# Patient Record
Sex: MALE | Race: WHITE | Employment: FULL TIME | URBAN - METROPOLITAN AREA
[De-identification: names, ages, dates, MRNs, and addresses within clinical notes are randomized per-mention and may not be internally consistent; named-entity substitution may affect disease eponyms.]

---

## 2022-02-02 ENCOUNTER — TRANSCRIBE ORDER (OUTPATIENT)
Dept: SCHEDULING | Age: 52
End: 2022-02-02

## 2022-02-02 DIAGNOSIS — R42 DIZZINESS AND GIDDINESS: Primary | ICD-10-CM

## 2022-02-02 DIAGNOSIS — R42 VERTIGO: ICD-10-CM

## 2022-02-04 ENCOUNTER — TRANSCRIBE ORDER (OUTPATIENT)
Dept: SCHEDULING | Age: 52
End: 2022-02-04

## 2022-02-04 DIAGNOSIS — R42 DIZZINESS AND GIDDINESS: Primary | ICD-10-CM

## 2022-02-14 ENCOUNTER — HOSPITAL ENCOUNTER (OUTPATIENT)
Dept: MRI IMAGING | Age: 52
Discharge: HOME | End: 2022-02-14
Attending: OTOLARYNGOLOGY
Payer: OTHER GOVERNMENT

## 2022-02-14 VITALS — WEIGHT: 150 LBS

## 2022-02-14 DIAGNOSIS — R42 DIZZINESS AND GIDDINESS: ICD-10-CM

## 2022-02-14 DIAGNOSIS — R42 VERTIGO: ICD-10-CM

## 2022-02-14 LAB — CREAT UR-MCNC: 0.9 MG/DL (ref 0.6–1.3)

## 2022-02-14 PROCEDURE — 70553 MRI BRAIN STEM W/O & W/DYE: CPT

## 2022-02-14 PROCEDURE — A9576 INJ PROHANCE MULTIPACK: HCPCS | Performed by: OTOLARYNGOLOGY

## 2022-02-14 PROCEDURE — 74011250636 HC RX REV CODE- 250/636: Performed by: OTOLARYNGOLOGY

## 2022-02-14 PROCEDURE — 82565 ASSAY OF CREATININE: CPT

## 2022-02-14 RX ADMIN — GADOTERIDOL 15 ML: 279.3 INJECTION, SOLUTION INTRAVENOUS at 14:50

## 2022-09-09 ENCOUNTER — TRANSCRIBE ORDER (OUTPATIENT)
Dept: REGISTRATION | Age: 52
End: 2022-09-09

## 2022-09-09 ENCOUNTER — HOSPITAL ENCOUNTER (OUTPATIENT)
Dept: NON INVASIVE DIAGNOSTICS | Age: 52
Discharge: HOME | End: 2022-09-09
Payer: OTHER GOVERNMENT

## 2022-09-09 ENCOUNTER — HOSPITAL ENCOUNTER (OUTPATIENT)
Dept: LAB | Age: 52
Discharge: HOME | End: 2022-09-09
Payer: OTHER GOVERNMENT

## 2022-09-09 DIAGNOSIS — J34.2 DEVIATED NASAL SEPTUM: ICD-10-CM

## 2022-09-09 DIAGNOSIS — J34.2 DEVIATED NASAL SEPTUM: Primary | ICD-10-CM

## 2022-09-09 LAB
ATRIAL RATE: 74 BPM
CALCULATED P AXIS, ECG09: 76 DEGREES
CALCULATED R AXIS, ECG10: 73 DEGREES
CALCULATED T AXIS, ECG11: 58 DEGREES
DIAGNOSIS, 93000: NORMAL
HCT VFR BLD AUTO: 47.6 % (ref 36–48)
HGB BLD-MCNC: 16.2 G/DL (ref 13–16)
P-R INTERVAL, ECG05: 134 MS
POTASSIUM SERPL-SCNC: 4.5 MMOL/L (ref 3.5–5.5)
Q-T INTERVAL, ECG07: 390 MS
QRS DURATION, ECG06: 88 MS
QTC CALCULATION (BEZET), ECG08: 432 MS
VENTRICULAR RATE, ECG03: 74 BPM

## 2022-09-09 PROCEDURE — 36415 COLL VENOUS BLD VENIPUNCTURE: CPT

## 2022-09-09 PROCEDURE — 84132 ASSAY OF SERUM POTASSIUM: CPT

## 2022-09-09 PROCEDURE — 85018 HEMOGLOBIN: CPT

## 2022-09-09 PROCEDURE — 93005 ELECTROCARDIOGRAM TRACING: CPT

## 2022-09-20 ENCOUNTER — HOSPITAL ENCOUNTER (OUTPATIENT)
Dept: LAB | Age: 52
Discharge: HOME | End: 2022-09-20
Payer: OTHER GOVERNMENT

## 2022-09-20 PROCEDURE — 88304 TISSUE EXAM BY PATHOLOGIST: CPT

## 2022-09-20 PROCEDURE — 88311 DECALCIFY TISSUE: CPT

## 2024-05-10 ENCOUNTER — TELEPHONE (OUTPATIENT)
Facility: HOSPITAL | Age: 54
End: 2024-05-10

## 2024-05-13 ENCOUNTER — HOSPITAL ENCOUNTER (OUTPATIENT)
Facility: HOSPITAL | Age: 54
Setting detail: RECURRING SERIES
Discharge: HOME | End: 2024-05-16
Payer: OTHER GOVERNMENT

## 2024-05-13 PROCEDURE — 97110 THERAPEUTIC EXERCISES: CPT

## 2024-05-13 PROCEDURE — 97161 PT EVAL LOW COMPLEX 20 MIN: CPT

## 2024-05-13 NOTE — PROGRESS NOTES
PT DAILY TREATMENT NOTE/KNEE EVAL 10-18    Patient Name: Julius Heller  Date:2024  : 1970  [x]  Patient  Verified  Payor: [employer] EAST / Plan: [employer] EAST PRIME / Product Type: *No Product type* /    In time:1330  Out time:1415  Total Treatment Time (min): 45  Visit #: 1 of 15    Treatment Area: Right knee pain [M25.561]    SUBJECTIVE  Pain Level (0-10 scale):2  []constant [x]intermittent []improving []worsening []no change since onset    Any medication changes, allergies to medications, adverse drug reactions, diagnosis change, or new procedure performed?: [x] No    [] Yes (see summary sheet for update)  Subjective functional status/changes:     Patient presents with c/o right knee after chasing a dog on approximately 2023.  Patient describes pain as sharp. Denies numbness/tingling. Denies popping/clicking. Aggravating factors:squatting, lunging, kneeling, walking, hiking. Alleviating factors: rest. Denies red flags: SOB, chest pain, dizziness/lightheadedness, blurred/double vision, HA, chills/fevers, night sweats, change in bowel/bladder control, abdominal pain, difficulty swallowing, slurred speech, unexplained weight gain/loss, nausea, vomiting. PMHx: HTN, GERD. Surgical Hx: deviated septum. Social Hx: Lives with spouse in a two story home, work status: . PLOF: hiking, running.  Diagnostic Imaging: none per     OBJECTIVE/EXAMINATION    22 min [x]Eval                  []Re-Eval       23 min Therapeutic Exercise:  [x] See flow sheet :   Rationale: increase ROM, increase strength and decrease pain to improve the patient’s ability to complete ADLs        With   [x] TE   [] TA   [] neuro   [] other: Patient Education: [x] Review HEP    [] Progressed/Changed HEP based on:   [] positioning   [] body mechanics   [] transfers   [] heat/ice application    [] other:        Physical Therapy Evaluation - Knee    Posture: Forward head and rounded shoulder    Gait:  [] Normal    [] 
rehabilitation/strengthening program.   Status at IE: NA   Current:Same as IE     Patient will increase strength to 5/5 throughout bilateral LEs to aid in completion of ADLs.   Status at IE:4-/5   Current:Same as IE     Patient will report pain less than 1-2/10 average to aid in completion of ADLs.   Status at IE:2-5/10   Current:Same as IE     Patient will perform 10 pain free STS with 15 lbs with good form/technique to aid in completion of ADLs.   Status at IE:pain during STS   Current:Same as IE    Patient will improve FOTO to 67 points overall to demonstrate improvement in functional ability.   Status at IE:FOTO score = 52 (an established functional score where 100 = no disability)   Current: Same as IE      Frequency / Duration: Patient to be seen 2 times per week for 8 weeks    Patient/ Caregiver education and instruction: Diagnosis, prognosis, self care, activity modification, and exercises     [x]  Plan of care has been reviewed with PTA    Certification Period: KARL Villavicencio, PT 5/13/2024 7:50 PM  _____________________________________________________________________  I certify that the above Therapy Services are being furnished while the patient is under my care. I agree with the treatment plan and certify that this therapy is necessary.    Physician's Signature:_______________________ Date:_________ TIME:________                              Skinny Schmidt DO  Insurance: Payor:  EAST / Plan:  EAST PRIME / Product Type: *No Product type* /      ** Signature, Date and Time must be completed for valid certification **    [facility]  [address] [address]  Phone: [phone]   Fax: [phone]

## 2024-05-17 ENCOUNTER — TELEPHONE (OUTPATIENT)
Facility: HOSPITAL | Age: 54
End: 2024-05-17

## 2024-05-17 ENCOUNTER — HOSPITAL ENCOUNTER (OUTPATIENT)
Facility: HOSPITAL | Age: 54
Setting detail: RECURRING SERIES
Discharge: HOME | End: 2024-05-20
Payer: OTHER GOVERNMENT

## 2024-05-17 PROCEDURE — 97112 NEUROMUSCULAR REEDUCATION: CPT

## 2024-05-17 PROCEDURE — 97530 THERAPEUTIC ACTIVITIES: CPT

## 2024-05-17 PROCEDURE — 97110 THERAPEUTIC EXERCISES: CPT

## 2024-05-17 NOTE — PROGRESS NOTES
totals to left)  8-22 min = 1 unit; 23-37 min = 2 units; 38-52 min = 3 units; 53-67 min = 4 units; 68-82 min = 5 units   Total Total     TOTAL TREATMENT TIME:        40     [x]  Patient Education billed concurrently with other procedures   [x] Review HEP    [] Progressed/Changed HEP, detail:    [] Other detail:       Objective Information/Functional Measures/Assessment    Pt tolerated first follow up well with low load strengthening. He requires some visual cues with use of a mirror to facilitate even WB during sit to stands as he offloads his RLE. No adverse response to treatment noted.    Patient will continue to benefit from skilled PT / OT services to modify and progress therapeutic interventions, analyze and address functional mobility deficits, analyze and address ROM deficits, analyze and address strength deficits, analyze and address soft tissue restrictions, analyze and cue for proper movement patterns, and instruct in home and community integration to address functional deficits and attain remaining goals.    Progress toward goals / Updated goals:  []  See Progress Note/Recertification    Short Term Goals: To be accomplished in 4 weeks:                 Patient will report compliance with HEP at least 1x/day to aid in rehabilitation program.                 Status at IE:Provided initial HEP                 Current:Same as IE     Long Term Goals: To be accomplished in 8 weeks:                    Patient will report compliance with HEP a least 3-4x/week to aid in rehabilitation/strengthening program.                 Status at IE: NA                 Current:Same as IE                    Patient will increase strength to 5/5 throughout bilateral LEs to aid in completion of ADLs.                 Status at IE:4-/5                 Current:Same as IE                    Patient will report pain less than 1-2/10 average to aid in completion of ADLs.                 Status at IE:2-5/10                 Current:Same as

## 2024-05-20 ENCOUNTER — HOSPITAL ENCOUNTER (OUTPATIENT)
Facility: HOSPITAL | Age: 54
Setting detail: RECURRING SERIES
Discharge: HOME | End: 2024-05-23
Payer: OTHER GOVERNMENT

## 2024-05-20 PROCEDURE — 97016 VASOPNEUMATIC DEVICE THERAPY: CPT

## 2024-05-20 PROCEDURE — 97112 NEUROMUSCULAR REEDUCATION: CPT

## 2024-05-20 PROCEDURE — 97530 THERAPEUTIC ACTIVITIES: CPT

## 2024-05-20 PROCEDURE — 97110 THERAPEUTIC EXERCISES: CPT

## 2024-05-20 NOTE — PROGRESS NOTES
PHYSICAL / OCCUPATIONAL THERAPY - DAILY TREATMENT NOTE     Patient Name: Julius Heller    Date: 2024    : 1970  Insurance: Payor:  EAST / Plan:  EAST PRIME / Product Type: *No Product type* /      Patient  verified Yes     Visit #   Current / Total 3 16   Time   In / Out 1010 1109   Pain   In / Out 2 1-2   Subjective Functional Status/Changes: \"I had muscle soreness after last visit but no increase in knee pain.\"   Changes to:  Allergies, Med Hx, Sx Hx?   no       TREATMENT AREA =  Right knee pain [M25.561]    OBJECTIVE    Modalities Rationale:     decrease edema, decrease inflammation, decrease pain, increase tissue extensibility, and increase muscle contraction/control to improve patient's ability to progress to PLOF and address remaining functional goals.     10 min [x]  Vasopneumatic Device, press/temp: Med/34 degrees   If using vaso (only need to measure limb vaso being performed on)      pre-treatment girth : 35.8 cm      post-treatment girth : 35.5 cm      measured at (landmark location) :  right knee mid patella   Skin assessment post-treatment (if applicable):    []  intact    []  redness- no adverse reaction                 []redness - adverse reaction:         Therapeutic Procedures:  Tx Min Billable or 1:1 Min (if diff from Tx Min) Procedure, Rationale, Specifics   23  19976 Therapeutic Exercise (timed):  increase ROM, strength, coordination, balance, and proprioception to improve patient's ability to progress to PLOF and address remaining functional goals. (see flow sheet as applicable)    Details if applicable:       10  55600 Neuromuscular Re-Education (timed):  improve balance, coordination, kinesthetic sense, posture, core stability and proprioception to improve patient's ability to develop conscious control of individual muscles and awareness of position of extremities in order to progress to PLOF and address remaining functional goals. (see flow sheet as

## 2024-05-22 ENCOUNTER — HOSPITAL ENCOUNTER (OUTPATIENT)
Facility: HOSPITAL | Age: 54
Setting detail: RECURRING SERIES
Discharge: HOME | End: 2024-05-25
Payer: OTHER GOVERNMENT

## 2024-05-22 PROCEDURE — 97530 THERAPEUTIC ACTIVITIES: CPT

## 2024-05-22 PROCEDURE — 97016 VASOPNEUMATIC DEVICE THERAPY: CPT

## 2024-05-22 PROCEDURE — 97110 THERAPEUTIC EXERCISES: CPT

## 2024-05-22 PROCEDURE — 97112 NEUROMUSCULAR REEDUCATION: CPT

## 2024-05-22 NOTE — PROGRESS NOTES
PHYSICAL / OCCUPATIONAL THERAPY - DAILY TREATMENT NOTE     Patient Name: Julius Heller    Date: 2024    : 1970  Insurance: Payor:  EAST / Plan:  EAST PRIME / Product Type: *No Product type* /      Patient  verified Yes     Visit #   Current / Total 4 16   Time   In / Out 2:59 3:55   Pain   In / Out 0 0   Subjective Functional Status/Changes: Pt reports mild soreness, but no pain in right knee today   Changes to:  Allergies, Med Hx, Sx Hx?   no       TREATMENT AREA =  Right knee pain [M25.561]    OBJECTIVE    Modalities Rationale:     decrease edema, decrease inflammation, decrease pain, increase tissue extensibility, and increase muscle contraction/control to improve patient's ability to progress to PLOF and address remaining functional goals.    10 min [x]  Vasopneumatic Device, press/temp: Med/34 degrees    min []  Whirlpool / Fluido:    If using vaso (only need to measure limb vaso being performed on)      pre-treatment girth : 36.1 cm      post-treatment girth : 35.4 cm      measured at (landmark location) :  Mid patella right knee    min []  Other:    Skin assessment post-treatment (if applicable):    [x]  intact    []  redness- no adverse reaction                 []redness - adverse reaction:         Therapeutic Procedures:  Tx Min Billable or 1:1 Min (if diff from Tx Min) Procedure, Rationale, Specifics   20  38115 Therapeutic Exercise (timed):  increase ROM, strength, coordination, balance, and proprioception to improve patient's ability to progress to PLOF and address remaining functional goals. (see flow sheet as applicable)    Details if applicable:       12  38340 Neuromuscular Re-Education (timed):  improve balance, coordination, kinesthetic sense, posture, core stability and proprioception to improve patient's ability to develop conscious control of individual muscles and awareness of position of extremities in order to progress to PLOF and address remaining

## 2024-05-29 ENCOUNTER — HOSPITAL ENCOUNTER (OUTPATIENT)
Facility: HOSPITAL | Age: 54
Setting detail: RECURRING SERIES
Discharge: HOME | End: 2024-06-01
Payer: OTHER GOVERNMENT

## 2024-05-29 PROCEDURE — 97110 THERAPEUTIC EXERCISES: CPT

## 2024-05-29 PROCEDURE — 97530 THERAPEUTIC ACTIVITIES: CPT

## 2024-05-29 PROCEDURE — 97112 NEUROMUSCULAR REEDUCATION: CPT

## 2024-05-29 PROCEDURE — 97016 VASOPNEUMATIC DEVICE THERAPY: CPT

## 2024-05-29 NOTE — PROGRESS NOTES
PHYSICAL / OCCUPATIONAL THERAPY - DAILY TREATMENT NOTE (updated )    Patient Name: Julius Heller    Date: 2024    : 1970  Insurance: Payor: [employer] EAST / Plan: [employer] EAST PRIME / Product Type: *No Product type* /      Patient  verified Yes     Visit #   Current / Total 5 15   Time   In / Out 1610 1709   Pain   In / Out 1-2 0   Subjective Functional Status/Changes: \"I took a swim test last night and the kicking of the leg while swimming seemed to aggravate the right knee a bit.\"   Changes to:  Meds, Allergies, Med Hx, Sx Hx?  If yes, update Summary List no       TREATMENT AREA =  Right knee pain [M25.561]    OBJECTIVE  Modalities Rationale:     decrease edema, decrease inflammation, decrease pain, increase tissue extensibility, and increase muscle contraction/control to improve patient's ability to progress to PLOF and address remaining functional goals.             10 min [x]  Vasopneumatic Device, press/temp: Med/34 degrees     min []  Whirlpool / Fluido:     If using vaso (only need to measure limb vaso being performed on)      pre-treatment girth : 36.0 cm      post-treatment girth : 35.3 cm      measured at (landmark location) :  Mid patella right knee     min []  Other:     Skin assessment post-treatment (if applicable):    [x]  intact    []  redness- no adverse reaction                 []redness - adverse reaction:       Therapeutic Procedures:  Tx Min Billable or 1:1 Min (if diff from Tx Min) Procedure, Rationale, Specifics     01134 Therapeutic Exercise (timed):  increase ROM, strength, coordination, balance, and proprioception to improve patient's ability to progress to PLOF and address remaining functional goals. (see flow sheet as applicable)     Details if applicable:       15  54647 Therapeutic Activity (timed):  use of dynamic activities replicating functional movements to increase ROM, strength, coordination, balance, and proprioception in order to improve patient's

## 2024-05-31 ENCOUNTER — HOSPITAL ENCOUNTER (OUTPATIENT)
Facility: HOSPITAL | Age: 54
Setting detail: RECURRING SERIES
End: 2024-05-31
Payer: OTHER GOVERNMENT

## 2024-05-31 PROCEDURE — 97530 THERAPEUTIC ACTIVITIES: CPT

## 2024-05-31 PROCEDURE — 97112 NEUROMUSCULAR REEDUCATION: CPT

## 2024-05-31 PROCEDURE — 97110 THERAPEUTIC EXERCISES: CPT

## 2024-05-31 NOTE — PROGRESS NOTES
PHYSICAL / OCCUPATIONAL THERAPY - DAILY TREATMENT NOTE (updated )    Patient Name: Julius Heller    Date: 2024    : 1970  Insurance: Payor:  EAST / Plan:  EAST PRIME / Product Type: *No Product type* /      Patient  verified Yes     Visit #   Current / Total 6 15   Time   In / Out 2:12 pm 2:47 pm   Pain   In / Out 2 0-1   Subjective Functional Status/Changes: \"Its been doing better. I was sore for the first time after last appt, but it was more muscle.\"   Changes to:  Meds, Allergies, Med Hx, Sx Hx?  If yes, update Summary List no       TREATMENT AREA =  Right knee pain [M25.561]    OBJECTIVE    Therapeutic Procedures:  Tx Min Billable or 1:1 Min (if diff from Tx Min) Procedure, Rationale, Specifics   15  29637 Therapeutic Exercise (timed):  increase ROM, strength, coordination, balance, and proprioception to improve patient's ability to progress to PLOF and address remaining functional goals. (see flow sheet as applicable)     Details if applicable:       10  50059 Therapeutic Activity (timed):  use of dynamic activities replicating functional movements to increase ROM, strength, coordination, balance, and proprioception in order to improve patient's ability to progress to PLOF and address remaining functional goals.  (see flow sheet as applicable)     Details if applicable:     10  20252 Neuromuscular Re-Education (timed):  improve balance, coordination, kinesthetic sense, posture, core stability and proprioception to improve patient's ability to develop conscious control of individual muscles and awareness of position of extremities in order to progress to PLOF and address remaining functional goals. (see flow sheet as applicable)     Details if applicable:     35  MC BC Totals Reminder: bill using total billable min of TIMED therapeutic procedures (example: do not include dry needle or estim unattended, both untimed codes, in totals to left)  8-22 min = 1 unit; 23-37 min =  2 units; 38-52 min = 3 units; 53-67 min = 4 units; 68-82 min = 5 units   Total Total       TOTAL TREATMENT TIME:     35       [x]  Patient Education billed concurrently with other procedures   [x] Review HEP    [] Progressed/Changed HEP, detail:    [] Other detail:       Objective Information/Functional Measures/Assessment    Patient reporting minimal symptoms with most activities. Minimal discomfort with lateral and eccentric step downs so held performance. Reduction in pain reported post treatment.    Patient will continue to benefit from skilled PT / OT services to modify and progress therapeutic interventions, analyze and address functional mobility deficits, analyze and address ROM deficits, analyze and address strength deficits, analyze and address soft tissue restrictions, analyze and cue for proper movement patterns, analyze and modify for postural abnormalities, analyze and address imbalance/dizziness, and instruct in home and community integration to address functional deficits and attain remaining goals.    Progress toward goals / Updated goals:  []  See Progress Note/Recertification    Short Term Goals: To be accomplished in 4 weeks:                 Patient will report compliance with HEP at least 1x/day to aid in rehabilitation program.                 Status at IE:Provided initial HEP                 Current: Pt reports HEP compliance at least 1x/day 5/22/24     Long Term Goals: To be accomplished in 8 weeks:                    Patient will report compliance with HEP a least 3-4x/week to aid in rehabilitation/strengthening program.                 Status at IE: NA                 Current:Same as IE                    Patient will increase strength to 5/5 throughout bilateral LEs to aid in completion of ADLs.                 Status at IE:4-/5                 Current:Same as IE                    Patient will report pain less than 1-2/10 average to aid in completion of ADLs.                 Status at

## 2024-06-04 ENCOUNTER — HOSPITAL ENCOUNTER (OUTPATIENT)
Facility: HOSPITAL | Age: 54
Setting detail: RECURRING SERIES
Discharge: HOME | End: 2024-06-07
Payer: OTHER GOVERNMENT

## 2024-06-04 PROCEDURE — 97016 VASOPNEUMATIC DEVICE THERAPY: CPT

## 2024-06-04 PROCEDURE — 97110 THERAPEUTIC EXERCISES: CPT

## 2024-06-04 PROCEDURE — 97112 NEUROMUSCULAR REEDUCATION: CPT

## 2024-06-04 PROCEDURE — 97530 THERAPEUTIC ACTIVITIES: CPT

## 2024-06-04 NOTE — PROGRESS NOTES
PHYSICAL / OCCUPATIONAL THERAPY - DAILY TREATMENT NOTE (updated )    Patient Name: Julius Heller    Date: 2024    : 1970  Insurance: Payor:  EAST / Plan:  EAST PRIME / Product Type: *No Product type* /      Patient  verified Yes     Visit #   Current / Total 7 15   Time   In / Out 1449 1546   Pain   In / Out 0 0   Subjective Functional Status/Changes: \"I had knee pain with lateral sep ups last visit.\"   Changes to:  Meds, Allergies, Med Hx, Sx Hx?  If yes, update Summary List no       TREATMENT AREA =  Right knee pain [M25.561]    OBJECTIVE  Modalities Rationale:     decrease edema, decrease inflammation, decrease pain, increase tissue extensibility, and increase muscle contraction/control to improve patient's ability to progress to PLOF and address remaining functional goals.     10 min [x]  Vasopneumatic Device, press/temp: Med/34 degrees   If using vaso (only need to measure limb vaso being performed on)      pre-treatment girth : 36.9 cm      post-treatment girth : 35.5 cm      measured at (landmark location) :  right knee mid patella   Skin assessment post-treatment (if applicable):    []  intact    []  redness- no adverse reaction                 []redness - adverse reaction:         Therapeutic Procedures:  Tx Min Billable or 1:1 Min (if diff from Tx Min) Procedure, Rationale, Specifics   25  36193 Therapeutic Exercise (timed):  increase ROM, strength, coordination, balance, and proprioception to improve patient's ability to progress to PLOF and address remaining functional goals. (see flow sheet as applicable)     Details if applicable:       12  51281 Therapeutic Activity (timed):  use of dynamic activities replicating functional movements to increase ROM, strength, coordination, balance, and proprioception in order to improve patient's ability to progress to PLOF and address remaining functional goals.  (see flow sheet as applicable)     Details if applicable:     10

## 2024-06-06 ENCOUNTER — HOSPITAL ENCOUNTER (OUTPATIENT)
Facility: HOSPITAL | Age: 54
Setting detail: RECURRING SERIES
Discharge: HOME | End: 2024-06-09
Payer: OTHER GOVERNMENT

## 2024-06-06 PROCEDURE — 97110 THERAPEUTIC EXERCISES: CPT

## 2024-06-06 PROCEDURE — 97112 NEUROMUSCULAR REEDUCATION: CPT

## 2024-06-06 PROCEDURE — 97016 VASOPNEUMATIC DEVICE THERAPY: CPT

## 2024-06-06 PROCEDURE — 97530 THERAPEUTIC ACTIVITIES: CPT

## 2024-06-06 NOTE — PROGRESS NOTES
IE:Provided initial HEP                 Current: Met, 6/4/2024     Long Term Goals: To be accomplished in 8 weeks:                    Patient will report compliance with HEP a least 3-4x/week to aid in rehabilitation/strengthening program.                 Status at IE: NA                 Current:Same as IE                    Patient will increase strength to 5/5 throughout bilateral LEs to aid in completion of ADLs.                 Status at IE:4-/5                 Current:Same as IE                    Patient will report pain less than 1-2/10 average to aid in completion of ADLs.                 Status at IE:2-5/10                 Current:Same as IE                    Patient will perform 10 pain free STS with 15 lbs with good form/technique to aid in completion of ADLs.                 Status at IE:pain during STS                 Current: able to perform 3/4 squats without increased pain, 5/29/2024     Patient will improve FOTO to 67 points overall to demonstrate improvement in functional ability.                 Status at IE:FOTO score = 52 (an established functional score where 100 = no disability)                 Current: Same as IE    PLAN  Yes  Continue plan of care  []  Upgrade activities as tolerated  []  Discharge due to:   []  Other:    Skinny Villavicencio PT    6/6/2024    3:09 PM    Future Appointments   Date Time Provider Department Center   6/11/2024  2:50 PM Skinny Villavicencio, PT [facility]   6/13/2024  8:10 AM Skinny Villavicencio, PT [facility]   6/18/2024  2:50 PM Skinny Villavicencio, PT [facility]   6/20/2024  2:50 PM Skinny Villavicencio, PT [facility]   6/25/2024  2:50 PM Skinny Villavicencio, PT [facility]   6/27/2024  2:50 PM Skinny Villavicencio, PT [facility]

## 2024-06-11 ENCOUNTER — HOSPITAL ENCOUNTER (OUTPATIENT)
Facility: HOSPITAL | Age: 54
Setting detail: RECURRING SERIES
Discharge: HOME | End: 2024-06-14
Payer: OTHER GOVERNMENT

## 2024-06-11 PROCEDURE — 97530 THERAPEUTIC ACTIVITIES: CPT

## 2024-06-11 PROCEDURE — 97016 VASOPNEUMATIC DEVICE THERAPY: CPT

## 2024-06-11 PROCEDURE — 97110 THERAPEUTIC EXERCISES: CPT

## 2024-06-11 PROCEDURE — 97112 NEUROMUSCULAR REEDUCATION: CPT

## 2024-06-11 NOTE — PROGRESS NOTES
PHYSICAL / OCCUPATIONAL THERAPY - DAILY TREATMENT NOTE (updated )    Patient Name: Julius Heller    Date: 2024    : 1970  Insurance: Payor:  EAST / Plan:  EAST PRIME / Product Type: *No Product type* /      Patient  verified Yes     Visit #   Current / Total 9 15   Time   In / Out 1450 1550   Pain   In / Out 0 0   Subjective Functional Status/Changes: \"I had muscle soreness after last visit. I had more pain after attending a graduation when I was standing and sitting for extended periods of time.\"   Changes to:  Meds, Allergies, Med Hx, Sx Hx?  If yes, update Summary List no       TREATMENT AREA =  Right knee pain [M25.561]    OBJECTIVE  Modalities Rationale:     decrease edema, decrease inflammation, decrease pain, increase tissue extensibility, and increase muscle contraction/control to improve patient's ability to progress to PLOF and address remaining functional goals.     10 min [x]  Vasopneumatic Device, press/temp: Med/34 degrees   If using vaso (only need to measure limb vaso being performed on)      pre-treatment girth : 36.8 cm      post-treatment girth : 36.0 cm      measured at (landmark location) :  right knee mid patella   Skin assessment post-treatment (if applicable):    []  intact    []  redness- no adverse reaction                 []redness - adverse reaction:         Therapeutic Procedures:  Tx Min Billable or 1:1 Min (if diff from Tx Min) Procedure, Rationale, Specifics   25  39010 Therapeutic Exercise (timed):  increase ROM, strength, coordination, balance, and proprioception to improve patient's ability to progress to PLOF and address remaining functional goals. (see flow sheet as applicable)     Details if applicable:       [id number] Therapeutic Activity (timed):  use of dynamic activities replicating functional movements to increase ROM, strength, coordination, balance, and proprioception in order to improve patient's ability to progress to PLOF and

## 2024-06-13 ENCOUNTER — APPOINTMENT (OUTPATIENT)
Facility: HOSPITAL | Age: 54
End: 2024-06-13
Payer: OTHER GOVERNMENT

## 2024-06-13 ENCOUNTER — HOSPITAL ENCOUNTER (OUTPATIENT)
Facility: HOSPITAL | Age: 54
Setting detail: RECURRING SERIES
Discharge: HOME | End: 2024-06-16
Payer: OTHER GOVERNMENT

## 2024-06-13 PROCEDURE — 97016 VASOPNEUMATIC DEVICE THERAPY: CPT

## 2024-06-13 PROCEDURE — 97530 THERAPEUTIC ACTIVITIES: CPT

## 2024-06-13 PROCEDURE — 97112 NEUROMUSCULAR REEDUCATION: CPT

## 2024-06-13 PROCEDURE — 97110 THERAPEUTIC EXERCISES: CPT

## 2024-06-13 NOTE — PROGRESS NOTES
[facility]  [address] [address]            Phone: [phone]   Fax: [phone]    Progress Note  Patient name: Julius Heller Start of Care:  2024    Referral source: Skinny Schmidt DO : 1970   Medical/Treatment Diagnosis: Right knee pain [M25.561] Onset Date:2023        Prior Hospitalization: see medical history Provider#: [id number]   Medications: Verified on Patient Summary List    Comorbidities: HTN, GERD   Prior Level of Function:hiking, running     Visits from Start of Care: 10      Updated Goals/Measure of Progress:   Short Term Goals: To be accomplished in 4 weeks:                 Patient will report compliance with HEP at least 1x/day to aid in rehabilitation program.                 Status at IE:Provided initial HEP                 Current: Met, 2024     Long Term Goals: To be accomplished in 8 weeks:                    Patient will report compliance with HEP a least 3-4x/week to aid in rehabilitation/strengthening program.                 Status at IE: NA                 Current: In-progress, developing consistency transitioning to dynamic/ plymometric exercise                    Patient will increase strength to 5/5 throughout bilateral LEs to aid in completion of ADLs.                 Status at IE:4-/5                 Current:In-progress, , 2024                    Patient will report pain less than 1-2/10 average to aid in completion of ADLs.                 Status at IE:2-5/10                 Current:In-progress, 3/10, 2024                    Patient will perform 10 pain free STS with 15 lbs with good form/technique to aid in completion of ADLs.                 Status at IE:pain during STS                 Current:  In-progress, patient has begun TRX squats and lunges, 2024     Patient will improve FOTO to 67 points overall to demonstrate improvement in functional ability.                 Status at 
with HEP at least 1x/day to aid in rehabilitation program.                 Status at IE:Provided initial HEP                 Current: Met, 6/4/2024     Long Term Goals: To be accomplished in 8 weeks:                    Patient will report compliance with HEP a least 3-4x/week to aid in rehabilitation/strengthening program.                 Status at IE: NA                 Current: In-progress, developing consistency transitioning to dynamic/ plymometric exercise                    Patient will increase strength to 5/5 throughout bilateral LEs to aid in completion of ADLs.                 Status at IE:4-/5                 Current:In-progress, 4/5, 6/13/2024                    Patient will report pain less than 1-2/10 average to aid in completion of ADLs.                 Status at IE:2-5/10                 Current:In-progress, 3/10, 6/11/2024                    Patient will perform 10 pain free STS with 15 lbs with good form/technique to aid in completion of ADLs.                 Status at IE:pain during STS                 Current:  In-progress, patient has begun TRX squats and lunges, 6/13/2024     Patient will improve FOTO to 67 points overall to demonstrate improvement in functional ability.                 Status at IE:FOTO score = 52 (an established functional score where 100 = no disability)                 Current: In-progress, 75, 6/13/2024    PLAN  Yes  Continue plan of care  []  Upgrade activities as tolerated  []  Discharge due to:   []  Other:    Skinny Villavicencio PT    6/13/2024    8:15 AM    Future Appointments   Date Time Provider Department Center   6/18/2024  2:50 PM Skinny Villavicencio, PT [facility]   6/20/2024  2:50 PM Skinny Villavicencio, PT [facility]   6/25/2024  2:50 PM Skinny Villavicencio, PT [facility]   6/27/2024  2:50 PM Skinny Villavicencio, PT [facility]

## 2024-06-18 ENCOUNTER — HOSPITAL ENCOUNTER (OUTPATIENT)
Facility: HOSPITAL | Age: 54
Setting detail: RECURRING SERIES
Discharge: HOME | End: 2024-06-21
Payer: OTHER GOVERNMENT

## 2024-06-18 PROCEDURE — 97530 THERAPEUTIC ACTIVITIES: CPT

## 2024-06-18 PROCEDURE — 97112 NEUROMUSCULAR REEDUCATION: CPT

## 2024-06-18 PROCEDURE — 97016 VASOPNEUMATIC DEVICE THERAPY: CPT

## 2024-06-18 PROCEDURE — 97110 THERAPEUTIC EXERCISES: CPT

## 2024-06-18 NOTE — PROGRESS NOTES
PHYSICAL / OCCUPATIONAL THERAPY - DAILY TREATMENT NOTE (updated )    Patient Name: Julius Heller    Date: 2024    : 1970  Insurance: Payor:  EAST / Plan:  EAST PRIME / Product Type: *No Product type* /      Patient  verified Yes     Visit #   Current / Total 11 15   Time   In / Out 1450 1547   Pain   In / Out 0 0   Subjective Functional Status/Changes: \"I had increased muscle soreness after increasing intensity last visit but no nee pain.\"   Changes to:  Meds, Allergies, Med Hx, Sx Hx?  If yes, update Summary List no       TREATMENT AREA =  Right knee pain [M25.561]    OBJECTIVE  Modalities Rationale:     decrease edema, decrease inflammation, decrease pain, increase tissue extensibility, and increase muscle contraction/control to improve patient's ability to progress to PLOF and address remaining functional goals.     10 min [x]  Vasopneumatic Device, press/temp: Med/34 degrees   If using vaso (only need to measure limb vaso being performed on)      pre-treatment girth : 37.8 cm      post-treatment girth : 36.4 cm      measured at (landmark location) :  right knee mid patella   Skin assessment post-treatment (if applicable):    []  intact    []  redness- no adverse reaction                 []redness - adverse reaction:         Therapeutic Procedures:  Tx Min Billable or 1:1 Min (if diff from Tx Min) Procedure, Rationale, Specifics   23  [id number] Therapeutic Exercise (timed):  increase ROM, strength, coordination, balance, and proprioception to improve patient's ability to progress to PLOF and address remaining functional goals. (see flow sheet as applicable)     Details if applicable:       14  [id number] Therapeutic Activity (timed):  use of dynamic activities replicating functional movements to increase ROM, strength, coordination, balance, and proprioception in order to improve patient's ability to progress to PLOF and address remaining functional goals.  (see flow sheet as

## 2024-06-20 ENCOUNTER — HOSPITAL ENCOUNTER (OUTPATIENT)
Facility: HOSPITAL | Age: 54
Setting detail: RECURRING SERIES
Discharge: HOME | End: 2024-06-23
Payer: OTHER GOVERNMENT

## 2024-06-20 PROCEDURE — 97016 VASOPNEUMATIC DEVICE THERAPY: CPT

## 2024-06-20 PROCEDURE — 97112 NEUROMUSCULAR REEDUCATION: CPT

## 2024-06-20 PROCEDURE — 97110 THERAPEUTIC EXERCISES: CPT

## 2024-06-20 PROCEDURE — 97530 THERAPEUTIC ACTIVITIES: CPT

## 2024-06-20 NOTE — PROGRESS NOTES
PHYSICAL / OCCUPATIONAL THERAPY - DAILY TREATMENT NOTE (updated )    Patient Name: Julius Heller    Date: 2024    : 1970  Insurance: Payor:  EAST / Plan:  EAST PRIME / Product Type: *No Product type* /      Patient  verified Yes     Visit #   Current / Total 12 15   Time   In / Out 1450 1555   Pain   In / Out 2-3 0   Subjective Functional Status/Changes: \"I was very sore after last visit.\"   Changes to:  Meds, Allergies, Med Hx, Sx Hx?  If yes, update Summary List no       TREATMENT AREA =  Right knee pain [M25.561]    OBJECTIVE  Modalities Rationale:     decrease edema, decrease inflammation, decrease pain, increase tissue extensibility, and increase muscle contraction/control to improve patient's ability to progress to PLOF and address remaining functional goals.     10 min [x]  Vasopneumatic Device, press/temp: Med/34 degrees   If using vaso (only need to measure limb vaso being performed on)      pre-treatment girth : 37.5 cm      post-treatment girth : 36.3 cm      measured at (landmark location) :  right knee mid patella   Skin assessment post-treatment (if applicable):    []  intact    []  redness- no adverse reaction                 []redness - adverse reaction:         Therapeutic Procedures:  Tx Min Billable or 1:1 Min (if diff from Tx Min) Procedure, Rationale, Specifics   30  01652 Therapeutic Exercise (timed):  increase ROM, strength, coordination, balance, and proprioception to improve patient's ability to progress to PLOF and address remaining functional goals. (see flow sheet as applicable)     Details if applicable:       15  [id number] Therapeutic Activity (timed):  use of dynamic activities replicating functional movements to increase ROM, strength, coordination, balance, and proprioception in order to improve patient's ability to progress to PLOF and address remaining functional goals.  (see flow sheet as applicable)     Details if applicable:     10  [id number]

## 2024-06-25 ENCOUNTER — HOSPITAL ENCOUNTER (OUTPATIENT)
Facility: HOSPITAL | Age: 54
Setting detail: RECURRING SERIES
Discharge: HOME | End: 2024-06-28
Payer: OTHER GOVERNMENT

## 2024-06-25 PROCEDURE — 97016 VASOPNEUMATIC DEVICE THERAPY: CPT

## 2024-06-25 PROCEDURE — 97110 THERAPEUTIC EXERCISES: CPT

## 2024-06-25 PROCEDURE — 97530 THERAPEUTIC ACTIVITIES: CPT

## 2024-06-25 PROCEDURE — 97112 NEUROMUSCULAR REEDUCATION: CPT

## 2024-06-25 NOTE — PROGRESS NOTES
PHYSICAL / OCCUPATIONAL THERAPY - DAILY TREATMENT NOTE (updated )    Patient Name: Julius Heller    Date: 2024    : 1970  Insurance: Payor:  EAST / Plan:  EAST PRIME / Product Type: *No Product type* /      Patient  verified Yes     Visit #   Current / Total 13 15   Time   In / Out 1450 1553   Pain   In / Out 0 0   Subjective Functional Status/Changes: \"I am feeling better and have no pain today.\"   Changes to:  Meds, Allergies, Med Hx, Sx Hx?  If yes, update Summary List no       TREATMENT AREA =  Right knee pain [M25.561]    OBJECTIVE  Modalities Rationale:     decrease edema, decrease inflammation, decrease pain, increase tissue extensibility, and increase muscle contraction/control to improve patient's ability to progress to PLOF and address remaining functional goals.     10 min [x]  Vasopneumatic Device, press/temp: Med/34 degrees   If using vaso (only need to measure limb vaso being performed on)      pre-treatment girth : 37.5 cm      post-treatment girth : 36.3 cm      measured at (landmark location) :  right knee mid patella   Skin assessment post-treatment (if applicable):    []  intact    []  redness- no adverse reaction                 []redness - adverse reaction:         Therapeutic Procedures:  Tx Min Billable or 1:1 Min (if diff from Tx Min) Procedure, Rationale, Specifics   30  72500 Therapeutic Exercise (timed):  increase ROM, strength, coordination, balance, and proprioception to improve patient's ability to progress to PLOF and address remaining functional goals. (see flow sheet as applicable)     Details if applicable:       15  94277 Therapeutic Activity (timed):  use of dynamic activities replicating functional movements to increase ROM, strength, coordination, balance, and proprioception in order to improve patient's ability to progress to PLOF and address remaining functional goals.  (see flow sheet as applicable)     Details if applicable:     8

## 2024-06-27 ENCOUNTER — HOSPITAL ENCOUNTER (OUTPATIENT)
Facility: HOSPITAL | Age: 54
Setting detail: RECURRING SERIES
Discharge: HOME | End: 2024-06-30
Payer: OTHER GOVERNMENT

## 2024-06-27 PROCEDURE — 97016 VASOPNEUMATIC DEVICE THERAPY: CPT

## 2024-06-27 PROCEDURE — 97110 THERAPEUTIC EXERCISES: CPT

## 2024-06-27 PROCEDURE — 97112 NEUROMUSCULAR REEDUCATION: CPT

## 2024-06-27 PROCEDURE — 97530 THERAPEUTIC ACTIVITIES: CPT

## 2024-06-27 NOTE — PROGRESS NOTES
[facility]  [address] [address]  Phone: [phone]   Fax: [phone]    Discharge Summary    Patient name: Julius Heller     Start of Care: 2024   Referral source: Skinny Schmidt DO    : 1970  Medical/Treatment Diagnosis: Right knee pain [M25.561]  Onset Date:2023     Prior Hospitalization: see medical history   Provider#: [id number]  Medications: Verified on Patient Summary List    Comorbidities: HTN, GERD   Prior Level of Function:hiking, running     Visits from Start of Care: 14         Goals/Measure of Progress:  Short Term Goals: To be accomplished in 4 weeks:                 Patient will report compliance with HEP at least 1x/day to aid in rehabilitation program.                 Status at IE:Provided initial HEP                 Current: Met, 2024     Long Term Goals: To be accomplished in 8 weeks:                    Patient will report compliance with HEP a least 3-4x/week to aid in rehabilitation/strengthening program.                 Status at IE: NA                 Current: Met, 2024                    Patient will increase strength to 5/5 throughout bilateral LEs to aid in completion of ADLs.                 Status at IE:4-/5                 Current:Met, 5/5, 2024                    Patient will report pain less than 1-2/10 average to aid in completion of ADLs.                 Status at IE:2-5/10                 Current:Met, 0-2/10, 2024                    Patient will perform 10 pain free STS with 15 lbs with good form/technique to aid in completion of ADLs.                 Status at IE:pain during STS                 Current:  Met, 2024     Patient will improve FOTO to 67 points overall to demonstrate improvement in functional ability.                 Status at IE:FOTO score = 52 (an established functional score where 100 = no disability)                 Current: Met, 72, 2024     Assessment/ 
            Patient will increase strength to 5/5 throughout bilateral LEs to aid in completion of ADLs.                 Status at IE:4-/5                 Current:Met, 5/5, 6/27/2024                    Patient will report pain less than 1-2/10 average to aid in completion of ADLs.                 Status at IE:2-5/10                 Current:Met, 0-2/10, 6/25/2024                    Patient will perform 10 pain free STS with 15 lbs with good form/technique to aid in completion of ADLs.                 Status at IE:pain during STS                 Current:  Met, 6/27/2024     Patient will improve FOTO to 67 points overall to demonstrate improvement in functional ability.                 Status at IE:FOTO score = 52 (an established functional score where 100 = no disability)                 Current: Met, 72, 6/27/2024    PLAN  No  Continue plan of care  []  Upgrade activities as tolerated  [x]  Discharge due to:   []  Other:    Skinny Villavicencio, PT    6/27/2024    3:20 PM    No future appointments.